# Patient Record
Sex: FEMALE | Race: WHITE
[De-identification: names, ages, dates, MRNs, and addresses within clinical notes are randomized per-mention and may not be internally consistent; named-entity substitution may affect disease eponyms.]

---

## 2018-04-08 ENCOUNTER — HOSPITAL ENCOUNTER (EMERGENCY)
Dept: HOSPITAL 17 - PHED | Age: 78
Discharge: HOME | End: 2018-04-08
Payer: MEDICARE

## 2018-04-08 VITALS — HEIGHT: 62 IN | BODY MASS INDEX: 26.78 KG/M2 | WEIGHT: 145.51 LBS

## 2018-04-08 VITALS
OXYGEN SATURATION: 95 % | RESPIRATION RATE: 18 BRPM | DIASTOLIC BLOOD PRESSURE: 63 MMHG | TEMPERATURE: 97.4 F | SYSTOLIC BLOOD PRESSURE: 135 MMHG | HEART RATE: 68 BPM

## 2018-04-08 VITALS — RESPIRATION RATE: 14 BRPM

## 2018-04-08 DIAGNOSIS — I10: ICD-10-CM

## 2018-04-08 DIAGNOSIS — S42.291A: Primary | ICD-10-CM
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
## 2018-04-08 DIAGNOSIS — Z79.899: ICD-10-CM

## 2018-04-08 DIAGNOSIS — K21.9: ICD-10-CM

## 2018-04-08 DIAGNOSIS — E78.00: ICD-10-CM

## 2018-04-08 PROCEDURE — 99283 EMERGENCY DEPT VISIT LOW MDM: CPT

## 2018-04-08 PROCEDURE — 73030 X-RAY EXAM OF SHOULDER: CPT

## 2018-04-08 PROCEDURE — 73060 X-RAY EXAM OF HUMERUS: CPT

## 2018-04-08 NOTE — PD
HPI


Chief Complaint:  Musculoskeletal Complaint


Time Seen by Provider:  15:38


Travel History


International Travel<30 days:  No


Contact w/Intl Traveler<30days:  No


Traveled to known affect area:  No





History of Present Illness


HPI


77-year-old female presents to the emergency room for evaluation of right 

shoulder pain after mechanical trip and fall just prior to arrival.  Patient 

was in her garage when she slipped on something on the floor and fell to the 

right.  She believes she struck her shoulder against a wall.  She states she 

never actually fell to the floor.  She adamantly denies hitting her head or 

loss of consciousness.  She denies any other pain.  Patient has not taken 

anything for her symptoms.  Pain is localized to the proximal humerus.  

Improved when her arm is held down against her abdomen.  Worsens with any range 

of motion.  Patient denies paresthesias.





PFSH


Past Medical History


Cardiovascular Problems:  Yes


High Cholesterol:  Yes


Diminished Hearing:  No


GERD:  Yes


Hypertension:  Yes


Migraines:  Yes


Pregnant?:  Not Pregnant


:  4


Para:  5





Past Surgical History


Abdominal Surgery:  Yes (CHOLECYSTECTOMY)


Cholecystectomy:  Yes


Oral Surgery:  Yes (TONSILECTOMY)


Tonsillectomy:  Yes





Social History


Alcohol Use:  No


Tobacco Use:  No


Substance Use:  No





Allergies-Medications


(Allergen,Severity, Reaction):  


Coded Allergies:  


     No Known Allergies (Verified  Adverse Reaction, Unknown, 18)


Reported Meds & Prescriptions





Reported Meds & Active Scripts


Active


Hydrocodone-Acetaminophen 5-325 mg Tab 1 Tab PO Q6H PRN


Reported


Memantine 10 Mg Tab 10 Mg PO BID


Donepezil 10 Mg Tab 10 Mg PO DAILY


Simvastatin 40 Mg Tab 40 Mg PO HS








Review of Systems


Except as stated in HPI:  all other systems reviewed are Neg





Physical Exam


Narrative


GENERAL: Well-nourished, well-developed female no acute distress.  Afebrile.  

Ambulatory.


SKIN: Focused skin assessment warm/dry.  No erythema or ecchymosis.


HEAD: Normocephalic.


EYES: No scleral icterus. No injection or drainage. 


NECK: Supple, trachea midline. No JVD or lymphadenopathy.


CARDIOVASCULAR: Regular rate and rhythm without murmurs, gallops, or rubs. 


RESPIRATORY: Breath sounds equal bilaterally. No accessory muscle use.


MUSCULOSKELETAL: No cyanosis.  No obvious edema.  Tenderness to palpation over 

the proximal humerus.  2+ radial pulse.  Radial, ulnar, and median nerves 

intact.  Extreme limited range of motion of the right upper extremity secondary 

to pain.





Data


Data


Last Documented VS





Vital Signs








  Date Time  Temp Pulse Resp B/P (MAP) Pulse Ox O2 Delivery O2 Flow Rate FiO2


 


18 17:11   14     


 


18 14:44 97.4 68  135/63 (87) 95   








Orders





 Orders


Humerus (Min 2vws) (18 )


Acetamin-Hydrocod 325-5 Mg (Norco  5-325 (18 16:00)


Shoulder, Limited(2vws) (18 )


Splint Or Brace Apply/Monitor (18 18:09)


Ed Discharge Order (18 18:16)








MDM


Medical Decision Making


Medical Screen Exam Complete:  Yes


Emergency Medical Condition:  Yes


Medical Record Reviewed:  Yes


Differential Diagnosis


Sprain, strain, contusion, fracture


Narrative Course


77-year-old female presents to the emergency room for evaluation of right 

shoulder pain after trip and fall just prior to arrival.  Patient adamantly 

denies any other injuries.  Physical exam reveals no obvious edema.  Tenderness 

to palpation over the proximal humerus.  2+ radial pulse.  Radial, ulnar, and 

median nerves intact.  Extreme limited range of motion of the right upper 

extremity secondary to pain.  X-ray of the shoulder shows minimally displaced 

humeral head fracture.  Patient was placed in a sling in the emergency room.  

She was told to follow-up with a primary care physician or return for worsening 

symptoms.  Discharge with prescription for Lortab.  She understands and agrees 

to plan.





Diagnosis





 Primary Impression:  


 Humeral head fracture


 Qualified Codes:  S42.291A - Other displaced fracture of upper end of right 

humerus, initial encounter for closed fracture


Referrals:  


Primary Care Physician





***Additional Instructions:  


Rest and drink plenty of fluids.


Use splint continuously until directed otherwise.  Try to maintain range of 

motion of your elbow and hand and wrist.


Take Lortab with food as directed, as needed for pain.  Do not drink alcohol or 

drive while taking this medication


Apply ice to the affected area for 20 minutes at a time, as needed for pain and 

swelling.


Follow-up with a primary care physician.


Return to the emergency room for worsening symptoms.


***Med/Other Pt SpecificInfo:  Prescription(s) given


Scripts


Hydrocodone-Acetaminophen (Hydrocodone-Acetaminophen) 5-325 mg Tab


1 TAB PO Q6H Y for PAIN, #12 TAB 0 Refills


   Prov: TexShelby L DO         18


Disposition:  01 DISCHARGE HOME


Condition:  Stable











Maliha Andino 2018 16:30

## 2018-04-08 NOTE — RADRPT
EXAM DATE/TIME:  04/08/2018 17:45 

 

[facility] COMPARISON:     

No previous studies available for comparison.

 

                     

INDICATIONS :     

Fell, right upper arm pain, limited ROM

                     

 

MEDICAL HISTORY :     

None.          

 

SURGICAL HISTORY :     

None.   

 

ENCOUNTER:     

Initial                                        

 

ACUITY:     

1 day      

 

PAIN SCORE:     

8/10

 

LOCATION:     

Right  humerus

 

FINDINGS:     

Two view examination of the right humerus demonstrates no evidence of fracture or dislocation.  Bony 
mineralization is normal.  The soft tissue structures are intact.

 

CONCLUSION:     Intact right humerus.

 

 

 

 Bear Andujar MD on April 08, 2018 at 18:00           

Board Certified Radiologist.

 This report was verified electronically.

## 2018-04-08 NOTE — RADRPT
EXAM DATE/TIME:  04/08/2018 17:45 

 

[facility] COMPARISON:     

No previous studies available for comparison.

 

                     

INDICATIONS :     

Fell, right shoulder pain, limited ROM

                     

 

MEDICAL HISTORY :     

None.          

 

SURGICAL HISTORY :     

None.   

 

ENCOUNTER:     

Initial                                        

 

ACUITY:     

1 day      

 

PAIN SCORE:     

8/10

 

LOCATION:     

Right  shoulder

 

FINDINGS:     

There is a minimally displaced fracture through the lateral aspect of the humeral head. No dislocatio
n. No other fractures are seen.

 

CONCLUSION:     

Minimally displaced fracture through the lateral aspect of the humeral head. 

 

 

 Anthony Cheek MD on April 08, 2018 at 17:59           

Board Certified Radiologist.

 This report was verified electronically.